# Patient Record
Sex: MALE | Race: WHITE | Employment: FULL TIME | URBAN - NONMETROPOLITAN AREA
[De-identification: names, ages, dates, MRNs, and addresses within clinical notes are randomized per-mention and may not be internally consistent; named-entity substitution may affect disease eponyms.]

---
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
## 2019-02-19 ENCOUNTER — OFFICE VISIT (OUTPATIENT)
Dept: FAMILY MEDICINE | Facility: OTHER | Age: 19
End: 2019-02-19
Attending: FAMILY MEDICINE
Payer: COMMERCIAL

## 2019-02-19 VITALS
DIASTOLIC BLOOD PRESSURE: 82 MMHG | SYSTOLIC BLOOD PRESSURE: 118 MMHG | TEMPERATURE: 98.2 F | OXYGEN SATURATION: 99 % | WEIGHT: 165 LBS | HEART RATE: 84 BPM

## 2019-02-19 DIAGNOSIS — G43.109 MIGRAINE WITH AURA AND WITHOUT STATUS MIGRAINOSUS, NOT INTRACTABLE: Primary | ICD-10-CM

## 2019-02-19 PROCEDURE — 99203 OFFICE O/P NEW LOW 30 MIN: CPT | Performed by: FAMILY MEDICINE

## 2019-02-19 RX ORDER — SUMATRIPTAN 50 MG/1
50 TABLET, FILM COATED ORAL
Qty: 9 TABLET | Refills: 1 | Status: SHIPPED | OUTPATIENT
Start: 2019-02-19 | End: 2019-10-29

## 2019-02-19 ASSESSMENT — PAIN SCALES - GENERAL: PAINLEVEL: NO PAIN (0)

## 2019-02-19 NOTE — NURSING NOTE
No chief complaint on file.      Initial /82   Pulse 84   Temp 98.2  F (36.8  C) (Tympanic)   Wt 74.8 kg (165 lb)   SpO2 99%  There is no height or weight on file to calculate BMI.  Medication Reconciliation: complete    Caridad Pfeiffer, MA

## 2019-02-19 NOTE — PROGRESS NOTES
"  SUBJECTIVE:                                                    Ramírez Goldman is a 18 year old male who presents to clinic today for the following health issues:      Headaches      Duration: off and on for years    Description  Location: starts with vision and about 15 minutes later starts in the front of head and in the back of the head. Patient states \" feels like its in the brain stem\"    Character: throbbing pain, sharp pain, pounding  Frequency:  Used to get them 1-2 a month but has not had one since October. Had a very bad experience in October. Was found on the side of the road, \"sleeping\", dehydrated and disoriented.  Duration:  1-2 hours. Vision change lasts about 30 minutes.    Intensity:  moderate    Accompanying signs and symptoms:    Precipitating or Alleviating factors:  Nausea/vomiting: occasionally in severe cases.  Dizziness: sometimes in rare cases  Weakness or numbness: weird sensation in hands and sometimes got in face, rarely but has happened.  Visual changes: goes thorugh 3 stages. Vision feels \"airy\". Then has floaters. Last loses peripheral vision  Fever: no   Sinus or URI symptoms no     History  Head trauma: no   Family history of migraines: YES- mother and little brother and father used to get them  Previous tests for headaches: no   Neurologist evaluations: no   Able to do daily activities when headache present: YES  Wake with headaches: YES  Daily pain medication use: no   Any changes in: stress is a key point at times but he feels he deals with stress pretty well.    Precipitating or Alleviating factors (light/sound/sleep/caffeine): sleep helps, lights make hurts, smells and sound make them worse.    Therapies tried and outcome: Ibuprofen (Advil, Motrin), Tylenol and Excedrin    Outcome - effective  Frequent/daily pain medication use: no         PROBLEMS TO ADD ON...    Problem list and histories reviewed & adjusted, as indicated.  Additional history: history as above.  Long hx " of migraine since age 9.  Felt one coming on, pulled over, went to sleep and vomited and had a severe headache.  Then the  pulled up and checked the situation.  The DMV requested medical eval.  No migraine since.  Gets a couple a year maybe.  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  No meds normally.  We talked about options.  No concerns here at all..  Patient seems entirely reliable.      There is no problem list on file for this patient.    No past surgical history on file.    Social History     Tobacco Use     Smoking status: Never Smoker     Smokeless tobacco: Never Used   Substance Use Topics     Alcohol use: Not on file     No family history on file.      Current Outpatient Medications   Medication Sig Dispense Refill     SUMAtriptan (IMITREX) 50 MG tablet Take 1 tablet (50 mg) by mouth at onset of headache for migraine 9 tablet 1     No Known Allergies    ROS:  Constitutional, HEENT, cardiovascular, pulmonary, gi and gu systems are negative, except as otherwise noted.    OBJECTIVE:                                                    /82   Pulse 84   Temp 98.2  F (36.8  C) (Tympanic)   Wt 74.8 kg (165 lb)   SpO2 99%   There is no height or weight on file to calculate BMI.  GENERAL APPEARANCE: Alert, no acute distress  HEENT:  Normal entirely.    CV: regular rate and rhythm, no murmur, rub or gallop  RESP: lungs clear to auscultation bilaterally  ABDOMEN: normal bowel sounds, soft, nontender, no hepatosplenomegaly or other masses  SKIN: no suspicious lesions or rashes to visualized skin  NEURO: Alert, oriented x 3, speech and mentation normal           ASSESSMENT/PLAN:                                                    1. Migraine with aura and without status migrainosus, not intractable  Reviewed.  History and the like completely consistent.  There was absolutely no LOC at all.  He went to sleep on the  side of the road with the migraine, which was actually smart.  I cleared him.  Need the one year f/u so we scheduled.  I gave him some imitrex to try with aura in case this happens again.  No issues here at all.  Seems very clear.    - SUMAtriptan (IMITREX) 50 MG tablet; Take 1 tablet (50 mg) by mouth at onset of headache for migraine  Dispense: 9 tablet; Refill: 1      F/u annually.      Houston Mayorga MD  [facility]

## 2019-10-21 NOTE — PROGRESS NOTES
SUBJECTIVE:   CC: Ramírez Goldman is an 19 year old male who presents for preventive health visit.     Healthy Habits:    Do you get at least three servings of calcium containing foods daily (dairy, green leafy vegetables, etc.)? yes    Amount of exercise or daily activities, outside of work: 5 day(s) per week    Problems taking medications regularly not applicable    Medication side effects: No    Have you had an eye exam in the past two years? no    Do you see a dentist twice per year? no    Do you have sleep apnea, excessive snoring or daytime drowsiness?no  Patient has yearly paperwork for the DMV for PCP to sign stating he is safe to drive. Will run grab after appointment and bring back for PCP to sign.    PROBLEMS TO ADD ON...no migraine since event last year.  No issues with driving.  No LOC.  I will do his form and no ongoing requirement for this.      Today's PHQ-2 Score:   PHQ-2 ( 1999 [employer]) 2/19/2019   Q1: Little interest or pleasure in doing things 0   Q2: Feeling down, depressed or hopeless 0   PHQ-2 Score 0       Abuse: Current or Past(Physical, Sexual or Emotional)- No  Do you feel safe in your environment? Yes    Social History     Tobacco Use     Smoking status: Never Smoker     Smokeless tobacco: Never Used   Substance Use Topics     Alcohol use: Not on file     If you drink alcohol do you typically have >3 drinks per day or >7 drinks per week? No                      Last PSA: No results found for: PSA    Reviewed orders with patient. Reviewed health maintenance and updated orders accordingly - Yes  Labs reviewed in EPIC    Reviewed and updated as needed this visit by clinical staff         Reviewed and updated as needed this visit by Provider        History reviewed. No pertinent past medical history.   History reviewed. No pertinent surgical history.    ROS:  CONSTITUTIONAL: NEGATIVE for fever, chills, change in weight  INTEGUMENTARY/SKIN: NEGATIVE for worrisome rashes, moles or  lesions  EYES: NEGATIVE for vision changes or irritation  ENT: NEGATIVE for ear, mouth and throat problems  RESP: NEGATIVE for significant cough or SOB  CV: NEGATIVE for chest pain, palpitations or peripheral edema  GI: NEGATIVE for nausea, abdominal pain, heartburn, or change in bowel habits   male: negative for dysuria, hematuria, decreased urinary stream, erectile dysfunction, urethral discharge  MUSCULOSKELETAL: NEGATIVE for significant arthralgias or myalgia  NEURO: NEGATIVE for weakness, dizziness or paresthesias  PSYCHIATRIC: NEGATIVE for changes in mood or affect    OBJECTIVE:   There were no vitals taken for this visit.  EXAM:  GENERAL: healthy, alert and no distress  EYES: Eyes grossly normal to inspection, PERRL and conjunctivae and sclerae normal  HENT: ear canals and TM's normal, nose and mouth without ulcers or lesions  NECK: no adenopathy, no asymmetry, masses, or scars and thyroid normal to palpation  RESP: lungs clear to auscultation - no rales, rhonchi or wheezes  CV: regular rate and rhythm, normal S1 S2, no S3 or S4, no murmur, click or rub, no peripheral edema and peripheral pulses strong  ABDOMEN: soft, nontender, no hepatosplenomegaly, no masses and bowel sounds normal   (male): normal male genitalia without lesions or urethral discharge, no hernia  MS: no gross musculoskeletal defects noted, no edema  SKIN: no suspicious lesions or rashes  NEURO: Normal strength and tone, mentation intact and speech normal  PSYCH: mentation appears normal, affect normal/bright    Diagnostic Test Results:  Labs reviewed in Epic    ASSESSMENT/PLAN:       ICD-10-CM    1. Routine general medical examination at a health care facility Z00.00    2. Migraine with aura and without status migrainosus, not intractable G43.109      We will get labs next year as he is not fasting today.      COUNSELING:  Reviewed preventive health counseling, as reflected in patient instructions    There is no height or weight on file  to calculate BMI.         reports that he has never smoked. He has never used smokeless tobacco.      Counseling Resources:  ATP IV Guidelines  Pooled Cohorts Equation Calculator  FRAX Risk Assessment  ICSI Preventive Guidelines  Dietary Guidelines for Americans, 2010  USDA's MyPlate  ASA Prophylaxis  Lung CA Screening    Houston Mayorga MD  [facility]

## 2019-10-29 ENCOUNTER — OFFICE VISIT (OUTPATIENT)
Dept: FAMILY MEDICINE | Facility: OTHER | Age: 19
End: 2019-10-29
Attending: FAMILY MEDICINE
Payer: COMMERCIAL

## 2019-10-29 VITALS
WEIGHT: 180 LBS | BODY MASS INDEX: 23.1 KG/M2 | DIASTOLIC BLOOD PRESSURE: 80 MMHG | HEART RATE: 72 BPM | SYSTOLIC BLOOD PRESSURE: 114 MMHG | OXYGEN SATURATION: 100 % | HEIGHT: 74 IN

## 2019-10-29 DIAGNOSIS — Z00.00 ROUTINE GENERAL MEDICAL EXAMINATION AT A HEALTH CARE FACILITY: Primary | ICD-10-CM

## 2019-10-29 DIAGNOSIS — G43.109 MIGRAINE WITH AURA AND WITHOUT STATUS MIGRAINOSUS, NOT INTRACTABLE: ICD-10-CM

## 2019-10-29 PROCEDURE — 99395 PREV VISIT EST AGE 18-39: CPT | Performed by: FAMILY MEDICINE

## 2019-10-29 ASSESSMENT — PAIN SCALES - GENERAL: PAINLEVEL: NO PAIN (0)

## 2019-10-29 ASSESSMENT — MIFFLIN-ST. JEOR: SCORE: 1898.03

## 2019-10-29 NOTE — NURSING NOTE
"Chief Complaint   Patient presents with     Physical       Initial /80   Pulse 72   Ht 1.882 m (6' 2.11\")   Wt 81.6 kg (180 lb)   SpO2 100%   BMI 23.04 kg/m   Estimated body mass index is 23.04 kg/m  as calculated from the following:    Height as of this encounter: 1.882 m (6' 2.11\").    Weight as of this encounter: 81.6 kg (180 lb).  Medication Reconciliation: complete  Caridad Pfeiffer MA  "